# Patient Record
(demographics unavailable — no encounter records)

---

## 2024-11-25 NOTE — ASSESSMENT
[FreeTextEntry1] :  PLAN - CTA head w/wo to r/o AV fistula - neurology referral for migraines - f/u after images to review  I, Dr. Napoleon Dobson, personally performed the evaluation and management (E/M) services for this new patient. That E/M includes conducting the clinically appropriate initial history &/or exam, assessing all conditions, and establishing the plan of care. Today, my ANAIS, Hyunchu Trish-Gold, was here to observe my evaluation and management service for this patient & follow plan of care established by me going forward.

## 2024-11-25 NOTE — HISTORY OF PRESENT ILLNESS
[FreeTextEntry1] : scalp lesion [de-identified] : 33 yo M with no known PMH reports chronic migraines since childhood which got markedly worsening for the past couple of months. He reports constant dull pain on his entire head with light sensitivity but denies dizziness, visual changes, weakness, n/v, or any other focal neuro deficits. He has been taking OTC (excedrin/Advil) with moderate relief.   CTH/MRI was completed by PCP which showed L paramedian parietal convexity skull thinning.

## 2024-11-25 NOTE — HISTORY OF PRESENT ILLNESS
[FreeTextEntry1] : scalp lesion [de-identified] : 33 yo M with no known PMH reports chronic migraines since childhood which got markedly worsening for the past couple of months. He reports constant dull pain on his entire head with light sensitivity but denies dizziness, visual changes, weakness, n/v, or any other focal neuro deficits. He has been taking OTC (excedrin/Advil) with moderate relief.   CTH/MRI was completed by PCP which showed L paramedian parietal convexity skull thinning.